# Patient Record
Sex: MALE | Race: WHITE
[De-identification: names, ages, dates, MRNs, and addresses within clinical notes are randomized per-mention and may not be internally consistent; named-entity substitution may affect disease eponyms.]

---

## 2019-09-03 ENCOUNTER — HOSPITAL ENCOUNTER (INPATIENT)
Dept: HOSPITAL 35 - ER | Age: 44
LOS: 1 days | Discharge: HOME | DRG: 305 | End: 2019-09-04
Attending: INTERNAL MEDICINE | Admitting: INTERNAL MEDICINE
Payer: COMMERCIAL

## 2019-09-03 VITALS — DIASTOLIC BLOOD PRESSURE: 95 MMHG | SYSTOLIC BLOOD PRESSURE: 162 MMHG

## 2019-09-03 VITALS — SYSTOLIC BLOOD PRESSURE: 209 MMHG | DIASTOLIC BLOOD PRESSURE: 140 MMHG

## 2019-09-03 VITALS — SYSTOLIC BLOOD PRESSURE: 160 MMHG | DIASTOLIC BLOOD PRESSURE: 99 MMHG

## 2019-09-03 VITALS
HEIGHT: 67.99 IN | BODY MASS INDEX: 34.56 KG/M2 | DIASTOLIC BLOOD PRESSURE: 148 MMHG | WEIGHT: 228 LBS | SYSTOLIC BLOOD PRESSURE: 226 MMHG

## 2019-09-03 VITALS — SYSTOLIC BLOOD PRESSURE: 140 MMHG | DIASTOLIC BLOOD PRESSURE: 88 MMHG

## 2019-09-03 VITALS — DIASTOLIC BLOOD PRESSURE: 99 MMHG | SYSTOLIC BLOOD PRESSURE: 155 MMHG

## 2019-09-03 VITALS — SYSTOLIC BLOOD PRESSURE: 199 MMHG | DIASTOLIC BLOOD PRESSURE: 135 MMHG

## 2019-09-03 VITALS — DIASTOLIC BLOOD PRESSURE: 112 MMHG | SYSTOLIC BLOOD PRESSURE: 168 MMHG

## 2019-09-03 VITALS — DIASTOLIC BLOOD PRESSURE: 104 MMHG | SYSTOLIC BLOOD PRESSURE: 166 MMHG

## 2019-09-03 VITALS — DIASTOLIC BLOOD PRESSURE: 95 MMHG | SYSTOLIC BLOOD PRESSURE: 147 MMHG

## 2019-09-03 VITALS — SYSTOLIC BLOOD PRESSURE: 181 MMHG | DIASTOLIC BLOOD PRESSURE: 102 MMHG

## 2019-09-03 VITALS — SYSTOLIC BLOOD PRESSURE: 148 MMHG | DIASTOLIC BLOOD PRESSURE: 91 MMHG

## 2019-09-03 DIAGNOSIS — R31.9: ICD-10-CM

## 2019-09-03 DIAGNOSIS — E78.5: ICD-10-CM

## 2019-09-03 DIAGNOSIS — I11.9: Primary | ICD-10-CM

## 2019-09-03 DIAGNOSIS — Z79.899: ICD-10-CM

## 2019-09-03 DIAGNOSIS — E11.9: ICD-10-CM

## 2019-09-03 LAB
ALBUMIN SERPL-MCNC: 4 G/DL (ref 3.4–5)
ALT SERPL-CCNC: 81 U/L (ref 30–65)
ANION GAP SERPL CALC-SCNC: 11 MMOL/L (ref 7–16)
AST SERPL-CCNC: 42 U/L (ref 15–37)
BASOPHILS NFR BLD AUTO: 1.1 % (ref 0–2)
BE(VIVO): -1.7 MMOL/L
BILIRUB SERPL-MCNC: 0.9 MG/DL
BILIRUB UR-MCNC: NEGATIVE MG/DL
BUN SERPL-MCNC: 14 MG/DL (ref 7–18)
CALCIUM SERPL-MCNC: 9.4 MG/DL (ref 8.5–10.1)
CHLORIDE SERPL-SCNC: 100 MMOL/L (ref 98–107)
CHOLEST SERPL-MCNC: 203 MG/DL (ref ?–200)
CO2 SERPL-SCNC: 27 MMOL/L (ref 21–32)
COLOR UR: YELLOW
CREAT SERPL-MCNC: 1 MG/DL (ref 0.7–1.3)
EOSINOPHIL NFR BLD: 1.8 % (ref 0–3)
ERYTHROCYTE [DISTWIDTH] IN BLOOD BY AUTOMATED COUNT: 13.8 % (ref 10.5–14.5)
GAS PNL BLDV: 45.9 MMHG (ref 35–45)
GLUCOSE SERPL-MCNC: 265 MG/DL (ref 74–106)
GRANULOCYTES NFR BLD MANUAL: 69.8 % (ref 36–66)
HCO3 BLD-SCNC: 23.7 MMOL/L (ref 22–26)
HCT VFR BLD CALC: 49.5 % (ref 42–52)
HDLC SERPL-MCNC: 36 MG/DL (ref 40–?)
HGB BLD-MCNC: 17.1 GM/DL (ref 14–18)
KETONES UR STRIP-MCNC: (no result) MG/DL
LDLC SERPL-MCNC: 142 MG/DL (ref ?–100)
LIPASE: 105 U/L (ref 73–393)
LYMPHOCYTES NFR BLD AUTO: 23 % (ref 24–44)
MCH RBC QN AUTO: 29.4 PG (ref 26–34)
MCHC RBC AUTO-ENTMCNC: 34.5 G/DL (ref 28–37)
MCV RBC: 85.4 FL (ref 80–100)
MONOCYTES NFR BLD: 4.3 % (ref 1–8)
MUCUS: (no result) STRN/LPF
NEUTROPHILS # BLD: 6.7 THOU/UL (ref 1.4–8.2)
NITRITE UR QL STRIP: NEGATIVE
PCO2 BLDV: 42.5 MMHG (ref 41–51)
PLATELET # BLD: 195 THOU/UL (ref 150–400)
POTASSIUM SERPL-SCNC: 3.1 MMOL/L (ref 3.5–5.1)
PROT SERPL-MCNC: 8.5 G/DL (ref 6.4–8.2)
RBC # BLD AUTO: 5.8 MIL/UL (ref 4.5–6)
RBC # UR STRIP: (no result) /UL
RBC #/AREA URNS HPF: (no result) /HPF (ref 0–2)
SODIUM SERPL-SCNC: 138 MMOL/L (ref 136–145)
SP GR UR STRIP: >= 1.03 (ref 1–1.03)
SQUAMOUS: (no result) /LPF (ref 0–3)
TC:HDL: 5.6 RATIO
TRANSITIONAL EPITHEL CELL: (no result) /LPF
TRIGL SERPL-MCNC: 125 MG/DL (ref ?–150)
TROPONIN I SERPL-MCNC: <0.06 NG/ML (ref ?–0.06)
URINE CLARITY: CLEAR
URINE GLUCOSE-RANDOM*: (no result)
URINE LEUKOCYTES: NEGATIVE
URINE PROTEIN (DIPSTICK): (no result)
UROBILINOGEN UR STRIP-ACNC: 0.2 E.U./DL (ref 0.2–1)
VLDLC SERPL CALC-MCNC: 25 MG/DL (ref ?–40)
WBC # BLD AUTO: 9.6 THOU/UL (ref 4–11)
WBC #/AREA URNS HPF: (no result) /HPF (ref 0–5)

## 2019-09-03 PROCEDURE — 10879: CPT

## 2019-09-03 NOTE — EKG
[address] [employer]
[place]  [address]
Phone:  [phone]                    ELECTROCARDIOGRAM REPORT      
_______________________________________________________________________________
 
Name:       LEEANNA FLORES                  Room #:         170-8       ADM IN  
M.R.#:      [id number]     Account #:      [id number]  
Admission:  19    Attend Phys:    Devin Negron
Discharge:              Date of Birth:  [birth year]  
                                                          Report #: [id number]
   [id number]
_______________________________________________________________________________
THIS REPORT FOR:   //name//                          
 
                         [facility] ED
                                       
Test Date:    2019               Test Time:    07:08:46
Pat Name:     LEEANNA FLORES             Department:   
Patient ID:   [id number]            Room:         170
Gender:       M                        Technician:   LUISITO
:          1975               Requested By: David Hackett
Order Number: [id number] MD:   John Mathias
                                 Measurements
Intervals                              Axis          
Rate:         84                       P:            49
NH:           217                      QRS:          -18
QRSD:         122                      T:            74
QT:           424                                    
QTc:          502                                    
                           Interpretive Statements
Sinus rhythm
Prolonged NH interval
Left atrial enlargement
Right bundle branch block
Left ventricular hypertrophy
 
Electronically Signed On 9-3-2019 9:14:30 CDT by John Mathias
[email or web address]
 
 
 
 
 
 
 
 
 
 
 
 
 
 
 
 
 
  <ELECTRONICALLY SIGNED>
   By: John Mathias MD        
  19     0914
D: 19 0708                           _____________________________________
T: 19                           John Mathias MD          /JOSE LUIS

## 2019-09-03 NOTE — NUR
Admitted from ER due to hypertensive emergency, transferred to room safely.
A+Ox4, drowsy. On room air. With SL at R AC. Able to use urinal to pass urine.
Complained of pain and nausea, due PRN medications given as prescribed. High
falls risk- falls bundle in place. Unable to do complete medication
reconcilaltion, pt unable to recall medications- will try to obtain
information once pt is more awake and feeling well. Assisted in ADLs. On blood
sugar monitoring- taken and recorded accordingly, sliding scale insulin given
as prescribed. Pt's initial BP upon admission 11am
205/141- Nicardipine started
as per EMAR order, checked at 11:/92, 11:/91, 12:/83.
Pt seen by Dr Negron- to stop Nicardipine drip.
Admission care rendered. Pt's BP at 1400, after turning off Nicardipine drip
is 178/106- Called Dr Negron, he said to keep Nicardipine drip off, he's ok
as long as SBP not over 180's- Charge nurse informed. 1500- /102, 1600-
/80, 1700-191/111- Dr Negron informed- To give hydralazine 10mg q4 for
Bp <180mmhg.
Pt on heart monitor- stips attached to chart. To continue monitoring.
Pt admission papers-unable to sign as of the moment, to try again tomorrow.
With valuables at the bedside, called in Security for Safekeeping.

## 2019-09-04 VITALS — DIASTOLIC BLOOD PRESSURE: 68 MMHG | SYSTOLIC BLOOD PRESSURE: 102 MMHG

## 2019-09-04 VITALS — SYSTOLIC BLOOD PRESSURE: 111 MMHG | DIASTOLIC BLOOD PRESSURE: 56 MMHG

## 2019-09-04 VITALS — DIASTOLIC BLOOD PRESSURE: 76 MMHG | SYSTOLIC BLOOD PRESSURE: 129 MMHG

## 2019-09-04 VITALS — SYSTOLIC BLOOD PRESSURE: 155 MMHG | DIASTOLIC BLOOD PRESSURE: 80 MMHG

## 2019-09-04 VITALS — DIASTOLIC BLOOD PRESSURE: 85 MMHG | SYSTOLIC BLOOD PRESSURE: 119 MMHG

## 2019-09-04 VITALS — DIASTOLIC BLOOD PRESSURE: 77 MMHG | SYSTOLIC BLOOD PRESSURE: 145 MMHG

## 2019-09-04 VITALS — SYSTOLIC BLOOD PRESSURE: 131 MMHG | DIASTOLIC BLOOD PRESSURE: 76 MMHG

## 2019-09-04 VITALS — SYSTOLIC BLOOD PRESSURE: 159 MMHG | DIASTOLIC BLOOD PRESSURE: 98 MMHG

## 2019-09-04 VITALS — SYSTOLIC BLOOD PRESSURE: 163 MMHG | DIASTOLIC BLOOD PRESSURE: 99 MMHG

## 2019-09-04 LAB
ANION GAP SERPL CALC-SCNC: 9 MMOL/L (ref 7–16)
BUN SERPL-MCNC: 20 MG/DL (ref 7–18)
CALCIUM SERPL-MCNC: 8.4 MG/DL (ref 8.5–10.1)
CHLORIDE SERPL-SCNC: 101 MMOL/L (ref 98–107)
CO2 SERPL-SCNC: 27 MMOL/L (ref 21–32)
CREAT SERPL-MCNC: 1.1 MG/DL (ref 0.7–1.3)
EST. AVERAGE GLUCOSE BLD GHB EST-MCNC: 163 MG/DL
GLUCOSE SERPL-MCNC: 163 MG/DL (ref 74–106)
GLYCOHEMOGLOBIN (HGB A1C): 7.3 % (ref 4.8–5.6)
MAGNESIUM SERPL-MCNC: 2.1 MG/DL (ref 1.8–2.4)
POTASSIUM SERPL-SCNC: 3.8 MMOL/L (ref 3.5–5.1)
SODIUM SERPL-SCNC: 137 MMOL/L (ref 136–145)

## 2019-09-04 NOTE — NUR
ASSUMED CARE AT 1900. PT'S BP ELEVATED AT START OF SHIFT, 181/102 /85;
GIVEN SCHEDULED CLONIDINE, BP'S DOWN -140/70-80. PT DENIES NAUSEA, BUT
REPORTS HE DOESN'T FEEL HUNGRY EITHER; ENCOURAGED HIM TO EAT MORE BLAND FOODS
INSTEAD OF SOME OF THE HIGHLY ACIDIC FRUITS HE HAD AT BEDSIDE. PT C/O FEELING
DIZZY AND HAVING GENERALIZED PAIN, ESPECIALLY IN HIS HEAD. AROUSEABLE BUT VERY
DROWSY, VERY WEAK LEGS. OBTAINED ORDERS TO REDRAW ELECTROLYTE LABS THIS AM. NO
OTHER CONCERNS, WILL CONTINUE TO MONITOR.

## 2019-09-04 NOTE — NUR
Case opened to follow for dc planning. Pt is orientedx 4 but drowsy. He
indicates that he lives with roommates in a house in [place]. He works
fulltime at [employer] and has insurance. He does not have an insurance card
and can not recall what ins plan he has. Writer assisted pt to call his
employer and they report he has [employer] [place]. Writer assisted the pt
in calling his customer service and verifying his ID/Gp number. Information
then provided to admitting and CM RN. Scripts faxed to pt's pharmacy at [facility] as he has used them before and they have his ins info on file. Cab
voucher provided for possible dc home later today. The pt is dependent on
bus transport most of the time. He requested help setting up a new pt appt
with a pcp. Insurance provided a list of options. Writer arranged a new pt
appt with the NP at Dr. Keith Bullock office [address] [place] [address] in [place]. The
appt is next tuesday at 10am. Instructions documented on the pt's dc
instructions.  Care team updated.

## 2019-09-06 ENCOUNTER — HOSPITAL ENCOUNTER (INPATIENT)
Dept: HOSPITAL 35 - ER | Age: 44
LOS: 3 days | Discharge: HOME | DRG: 78 | End: 2019-09-09
Attending: INTERNAL MEDICINE | Admitting: INTERNAL MEDICINE
Payer: COMMERCIAL

## 2019-09-06 VITALS — SYSTOLIC BLOOD PRESSURE: 206 MMHG | DIASTOLIC BLOOD PRESSURE: 130 MMHG

## 2019-09-06 VITALS — SYSTOLIC BLOOD PRESSURE: 170 MMHG | DIASTOLIC BLOOD PRESSURE: 104 MMHG

## 2019-09-06 VITALS — BODY MASS INDEX: 37.67 KG/M2 | WEIGHT: 240 LBS | HEIGHT: 67.01 IN

## 2019-09-06 VITALS — DIASTOLIC BLOOD PRESSURE: 100 MMHG | SYSTOLIC BLOOD PRESSURE: 168 MMHG

## 2019-09-06 VITALS — DIASTOLIC BLOOD PRESSURE: 135 MMHG | SYSTOLIC BLOOD PRESSURE: 229 MMHG

## 2019-09-06 VITALS — DIASTOLIC BLOOD PRESSURE: 99 MMHG | SYSTOLIC BLOOD PRESSURE: 150 MMHG

## 2019-09-06 VITALS — SYSTOLIC BLOOD PRESSURE: 150 MMHG | DIASTOLIC BLOOD PRESSURE: 99 MMHG

## 2019-09-06 VITALS — SYSTOLIC BLOOD PRESSURE: 192 MMHG | DIASTOLIC BLOOD PRESSURE: 116 MMHG

## 2019-09-06 VITALS — DIASTOLIC BLOOD PRESSURE: 134 MMHG | SYSTOLIC BLOOD PRESSURE: 214 MMHG

## 2019-09-06 VITALS — DIASTOLIC BLOOD PRESSURE: 107 MMHG | SYSTOLIC BLOOD PRESSURE: 164 MMHG

## 2019-09-06 VITALS — DIASTOLIC BLOOD PRESSURE: 124 MMHG | SYSTOLIC BLOOD PRESSURE: 206 MMHG

## 2019-09-06 DIAGNOSIS — Z91.14: ICD-10-CM

## 2019-09-06 DIAGNOSIS — I67.4: Primary | ICD-10-CM

## 2019-09-06 DIAGNOSIS — E11.65: ICD-10-CM

## 2019-09-06 DIAGNOSIS — I10: ICD-10-CM

## 2019-09-06 DIAGNOSIS — E66.01: ICD-10-CM

## 2019-09-06 DIAGNOSIS — E78.00: ICD-10-CM

## 2019-09-06 DIAGNOSIS — Z79.84: ICD-10-CM

## 2019-09-06 DIAGNOSIS — I16.1: ICD-10-CM

## 2019-09-06 LAB
ALBUMIN SERPL-MCNC: 3.7 G/DL (ref 3.4–5)
ALT SERPL-CCNC: 74 U/L (ref 30–65)
ANION GAP SERPL CALC-SCNC: 9 MMOL/L (ref 7–16)
AST SERPL-CCNC: 42 U/L (ref 15–37)
BASOPHILS NFR BLD AUTO: 0.9 % (ref 0–2)
BILIRUB SERPL-MCNC: 0.8 MG/DL
BUN SERPL-MCNC: 16 MG/DL (ref 7–18)
CALCIUM SERPL-MCNC: 9.1 MG/DL (ref 8.5–10.1)
CHLORIDE SERPL-SCNC: 104 MMOL/L (ref 98–107)
CO2 SERPL-SCNC: 28 MMOL/L (ref 21–32)
CREAT SERPL-MCNC: 1 MG/DL (ref 0.7–1.3)
EOSINOPHIL NFR BLD: 1.5 % (ref 0–3)
ERYTHROCYTE [DISTWIDTH] IN BLOOD BY AUTOMATED COUNT: 13.8 % (ref 10.5–14.5)
GLUCOSE SERPL-MCNC: 132 MG/DL (ref 74–106)
GRANULOCYTES NFR BLD MANUAL: 64.8 % (ref 36–66)
HCT VFR BLD CALC: 50.8 % (ref 42–52)
HGB BLD-MCNC: 17.4 GM/DL (ref 14–18)
LYMPHOCYTES NFR BLD AUTO: 24.4 % (ref 24–44)
MCH RBC QN AUTO: 29.7 PG (ref 26–34)
MCHC RBC AUTO-ENTMCNC: 34.2 G/DL (ref 28–37)
MCV RBC: 86.7 FL (ref 80–100)
MONOCYTES NFR BLD: 8.4 % (ref 1–8)
NEUTROPHILS # BLD: 5.9 THOU/UL (ref 1.4–8.2)
PLATELET # BLD: 263 THOU/UL (ref 150–400)
POTASSIUM SERPL-SCNC: 3.5 MMOL/L (ref 3.5–5.1)
PROT SERPL-MCNC: 8.3 G/DL (ref 6.4–8.2)
RBC # BLD AUTO: 5.86 MIL/UL (ref 4.5–6)
SODIUM SERPL-SCNC: 141 MMOL/L (ref 136–145)
TROPONIN I SERPL-MCNC: 0.1 NG/ML (ref ?–0.06)
WBC # BLD AUTO: 9 THOU/UL (ref 4–11)

## 2019-09-06 PROCEDURE — 10879: CPT

## 2019-09-06 NOTE — NUR
CALLED REPORT TO [facility]. NICARDIPINE GTT ORDERED, NOT AVAILABLE IN ED
[facility]. RECBEST RN WILL INITIATE GTT. PT BP REMAINS ELEVATED. OTHERWISE, NAD
NOTED. PT XFERED TO UNIT BY ED TECH.

## 2019-09-06 NOTE — NUR
Received pt from the ER, BP elevated. Orders for Nicardipine drip have not
started and was informed by ER that this was to be started at [facility]. Csalled
pharmacy for the medication was informed that this needs to be done in [facility],
asked the charge nurse, i was advised they had not done this drip for this
unit. Informed house sup, was advised that [facility] is cc [facility] and this was done
before and this needs to be done, I informed house sup I am from [facility] and
needed assistance, Petros from [facility] was sent to start and educated on the drip.
BP monitored, POC followed. endorsed to the night nurse.

## 2019-09-07 VITALS — SYSTOLIC BLOOD PRESSURE: 134 MMHG | DIASTOLIC BLOOD PRESSURE: 81 MMHG

## 2019-09-07 VITALS — SYSTOLIC BLOOD PRESSURE: 158 MMHG | DIASTOLIC BLOOD PRESSURE: 108 MMHG

## 2019-09-07 VITALS — SYSTOLIC BLOOD PRESSURE: 160 MMHG | DIASTOLIC BLOOD PRESSURE: 109 MMHG

## 2019-09-07 VITALS — SYSTOLIC BLOOD PRESSURE: 121 MMHG | DIASTOLIC BLOOD PRESSURE: 75 MMHG

## 2019-09-07 VITALS — DIASTOLIC BLOOD PRESSURE: 98 MMHG | SYSTOLIC BLOOD PRESSURE: 151 MMHG

## 2019-09-07 VITALS — SYSTOLIC BLOOD PRESSURE: 137 MMHG | DIASTOLIC BLOOD PRESSURE: 90 MMHG

## 2019-09-07 VITALS — SYSTOLIC BLOOD PRESSURE: 149 MMHG | DIASTOLIC BLOOD PRESSURE: 82 MMHG

## 2019-09-07 VITALS — SYSTOLIC BLOOD PRESSURE: 135 MMHG | DIASTOLIC BLOOD PRESSURE: 89 MMHG

## 2019-09-07 VITALS — SYSTOLIC BLOOD PRESSURE: 189 MMHG | DIASTOLIC BLOOD PRESSURE: 123 MMHG

## 2019-09-07 VITALS — DIASTOLIC BLOOD PRESSURE: 95 MMHG | SYSTOLIC BLOOD PRESSURE: 150 MMHG

## 2019-09-07 LAB
CHOLEST SERPL-MCNC: 188 MG/DL (ref ?–200)
EST. AVERAGE GLUCOSE BLD GHB EST-MCNC: 160 MG/DL
GLYCOHEMOGLOBIN (HGB A1C): 7.2 % (ref 4.8–5.6)
HDLC SERPL-MCNC: 38 MG/DL (ref 40–?)
LDLC SERPL-MCNC: 133 MG/DL (ref ?–100)
TC:HDL: 4.9 RATIO
TRIGL SERPL-MCNC: 87 MG/DL (ref ?–150)
VLDLC SERPL CALC-MCNC: 17 MG/DL (ref ?–40)

## 2019-09-07 NOTE — NUR
ASSUMED PATIENT CARE AT 0700. A/O X4. OFF [facility] GTT AT 1630. /90. NO
N/V. DENIES PAIN. DIABETES EDUCATION GIVEN. SOLWLY TOWARDS POC GOALS.

## 2019-09-07 NOTE — NUR
PATIENT IS ALERT AND ORIENTED. PATIENT IS SBA. PATIENT IS SINUS TACH ON TELE.
PATIENTS LBM WAS THE 6TH. PATIENT WAS ON THE NICARDIPINE DRIP CURRENTLY AT
3.5MG/HR. PATIENT STATES HE HAS INTERMIENT CHEST PAIN. PATIENT NEEDS SOME DM
AND HTN EDUCATION. PATIENT IS RESTING COMFORTABLY IN BED. WCM. PATIENT IS ON
ROOM AIR.

## 2019-09-08 VITALS — SYSTOLIC BLOOD PRESSURE: 167 MMHG | DIASTOLIC BLOOD PRESSURE: 105 MMHG

## 2019-09-08 VITALS — DIASTOLIC BLOOD PRESSURE: 113 MMHG | SYSTOLIC BLOOD PRESSURE: 159 MMHG

## 2019-09-08 VITALS — DIASTOLIC BLOOD PRESSURE: 101 MMHG | SYSTOLIC BLOOD PRESSURE: 144 MMHG

## 2019-09-08 VITALS — DIASTOLIC BLOOD PRESSURE: 116 MMHG | SYSTOLIC BLOOD PRESSURE: 169 MMHG

## 2019-09-08 VITALS — DIASTOLIC BLOOD PRESSURE: 103 MMHG | SYSTOLIC BLOOD PRESSURE: 153 MMHG

## 2019-09-08 VITALS — SYSTOLIC BLOOD PRESSURE: 166 MMHG | DIASTOLIC BLOOD PRESSURE: 112 MMHG

## 2019-09-08 VITALS — SYSTOLIC BLOOD PRESSURE: 168 MMHG | DIASTOLIC BLOOD PRESSURE: 117 MMHG

## 2019-09-08 VITALS — DIASTOLIC BLOOD PRESSURE: 108 MMHG | SYSTOLIC BLOOD PRESSURE: 166 MMHG

## 2019-09-08 NOTE — NUR
PATIENT IS PROGRESSING SLOWLY IN HIS CARE PLAN. VITAL SIGNS MOSTLY STABLE WITH
PATIENT EXHIBITING HYPERTENSION THAT WAS EFFECTIVELY CONTROLLED THROUGH PRN
MEDICATIONS. FULLY ALERT AND ORIENTED, PATIENT WAS ABLE TO CALL APPROPRIATELY
FOR NEEDS AND PARTICIPATE IN CARE. COMPLAINTS OF MINIMAL PAIN CONTROLLED
THROUGH TYLENOL.
PATIENT WAS UP WITH ASSISTANCE INCIDENT FREE. HE IS CONSIDERED
A HIGH FALL RISK. CONTINUE PLAN OF CARE.

## 2019-09-08 NOTE — NUR
PATIENT HAS PREFERRED TO STAY IN BED THROUGH THE DAY DESPITE NUMEROUS
ENCOURAGEMENT. EXPLAINED TO PATIENT THAT IF HE IS NOT DIZZY IT WILL BE
BENEFICIAL TO GET OUT OF BED EVEN TO GO TO THE BATHROOM AND HAVE URINAL.
PATIENT DOES NOT  SEEM TO BE IN PAIN AT THIS TIME. RESPIRATIONS NON LABORED.
BP HAS REMAINED MILDLY HIGH THROUGH THE DAY. PRN HYDRAZALINE ADMINISTERED.

## 2019-09-09 VITALS — SYSTOLIC BLOOD PRESSURE: 165 MMHG | DIASTOLIC BLOOD PRESSURE: 114 MMHG

## 2019-09-09 VITALS — SYSTOLIC BLOOD PRESSURE: 124 MMHG | DIASTOLIC BLOOD PRESSURE: 124 MMHG

## 2019-09-09 VITALS — DIASTOLIC BLOOD PRESSURE: 101 MMHG | SYSTOLIC BLOOD PRESSURE: 147 MMHG

## 2019-09-09 VITALS — DIASTOLIC BLOOD PRESSURE: 95 MMHG | SYSTOLIC BLOOD PRESSURE: 144 MMHG

## 2019-09-09 LAB
ANION GAP SERPL CALC-SCNC: 9 MMOL/L (ref 7–16)
BUN SERPL-MCNC: 17 MG/DL (ref 7–18)
CALCIUM SERPL-MCNC: 9.2 MG/DL (ref 8.5–10.1)
CHLORIDE SERPL-SCNC: 103 MMOL/L (ref 98–107)
CO2 SERPL-SCNC: 26 MMOL/L (ref 21–32)
CREAT SERPL-MCNC: 0.8 MG/DL (ref 0.7–1.3)
GLUCOSE SERPL-MCNC: 158 MG/DL (ref 74–106)
MAGNESIUM SERPL-MCNC: 1.9 MG/DL (ref 1.8–2.4)
POTASSIUM SERPL-SCNC: 4 MMOL/L (ref 3.5–5.1)
SODIUM SERPL-SCNC: 138 MMOL/L (ref 136–145)

## 2019-09-09 NOTE — NUR
PATIENT IS ADVANCING SLOWLY IN HIS CARE PLAN. VITAL SIGNS MOSTLY STABLE WITH
PATIENT RECEIVING FREQUENT MEDICATION AND INTERVENTION FOR HYPERTENSION.
PATIENT FREQUENTLY COMPLAINED OF HEADACHE PAIN WHICH NURSE TREATED THROUGH
MEDICATIONS AND NON PHARMACOLOGICAL INTERVENTIONS. NPO FROM EARLY THIS MORNING
IN ANTICIPATION OF TODAYS PROCEDURE. PATIENT WAS ABLE TO AMBULATE TO RESTROOM
WITH ASSISTANCE INCIDENT FREE. CONTINUE PLAN OF CARE.

## 2019-09-09 NOTE — NUR
PATIENT DISCHARGED AT THIS TIME TO HOME. EDUCATED ON MEDS, EXERCISE, DIET AND
NEED TO CONSISTENTLY TAKE HIS MEDICATIONS. HE DID VOICE UNDERSTANDING. PATIENT
DOES NOT SEEM TO BE IN PAIN. DIZZINESS HAS IMPROVED. HE WALKED AROUND UNIT A
FEW TIMES THIS AM. HE IS ALERT ORIENTED X4. WILL CONT WIHT PLAN OF CARE.

## 2021-03-03 ENCOUNTER — HOSPITAL ENCOUNTER (EMERGENCY)
Dept: HOSPITAL 35 - ER | Age: 46
Discharge: HOME | End: 2021-03-03
Payer: COMMERCIAL

## 2021-03-03 VITALS — DIASTOLIC BLOOD PRESSURE: 105 MMHG | SYSTOLIC BLOOD PRESSURE: 173 MMHG

## 2021-03-03 VITALS — WEIGHT: 220 LBS | HEIGHT: 67 IN | BODY MASS INDEX: 34.53 KG/M2

## 2021-03-03 DIAGNOSIS — Z79.899: ICD-10-CM

## 2021-03-03 DIAGNOSIS — R42: Primary | ICD-10-CM

## 2021-03-03 DIAGNOSIS — E11.9: ICD-10-CM

## 2021-03-03 DIAGNOSIS — I10: ICD-10-CM

## 2021-03-03 LAB
ALBUMIN SERPL-MCNC: 4.1 G/DL (ref 3.4–5)
ALT SERPL-CCNC: 87 U/L (ref 16–63)
ANION GAP SERPL CALC-SCNC: 7 MMOL/L (ref 7–16)
AST SERPL-CCNC: 43 U/L (ref 15–37)
BASOPHILS NFR BLD AUTO: 0.7 % (ref 0–2)
BILIRUB SERPL-MCNC: 0.7 MG/DL (ref 0.2–1)
BUN SERPL-MCNC: 21 MG/DL (ref 7–18)
CALCIUM SERPL-MCNC: 10.1 MG/DL (ref 8.5–10.1)
CHLORIDE SERPL-SCNC: 98 MMOL/L (ref 98–107)
CO2 SERPL-SCNC: 29 MMOL/L (ref 21–32)
CREAT SERPL-MCNC: 1 MG/DL (ref 0.7–1.3)
EOSINOPHIL NFR BLD: 2.6 % (ref 0–3)
ERYTHROCYTE [DISTWIDTH] IN BLOOD BY AUTOMATED COUNT: 13.8 % (ref 10.5–14.5)
GLUCOSE SERPL-MCNC: 175 MG/DL (ref 74–106)
GRANULOCYTES NFR BLD MANUAL: 64.5 % (ref 36–66)
HCT VFR BLD CALC: 50.8 % (ref 42–52)
HGB BLD-MCNC: 17.3 GM/DL (ref 14–18)
LYMPHOCYTES NFR BLD AUTO: 24 % (ref 24–44)
MCH RBC QN AUTO: 29.9 PG (ref 26–34)
MCHC RBC AUTO-ENTMCNC: 34 G/DL (ref 28–37)
MCV RBC: 87.8 FL (ref 80–100)
MONOCYTES NFR BLD: 8.2 % (ref 1–8)
NEUTROPHILS # BLD: 4.8 THOU/UL (ref 1.4–8.2)
PLATELET # BLD: 272 THOU/UL (ref 150–400)
POTASSIUM SERPL-SCNC: 3.7 MMOL/L (ref 3.5–5.1)
PROT SERPL-MCNC: 8.6 G/DL (ref 6.4–8.2)
RBC # BLD AUTO: 5.78 MIL/UL (ref 4.5–6)
SODIUM SERPL-SCNC: 134 MMOL/L (ref 136–145)
TROPONIN I SERPL-MCNC: <0.06 NG/ML (ref ?–0.06)
WBC # BLD AUTO: 7.5 THOU/UL (ref 4–11)

## 2021-03-03 NOTE — EKG
[address]
[place]  [address]
Phone:  [phone]                    ELECTROCARDIOGRAM REPORT      
_______________________________________________________________________________
 
Name:       LEEANNA FLORES                  Room #:                     DEP   
RBYCE#:      [id number]     Account #:      [id number]  
Admission:  21    Attend Phys:                          
Discharge:  21    Date of Birth:  [birth year]  
                                                          Report #: [id number]
   [id number]
_______________________________________________________________________________
                         [facility] ED
                                       
Test Date:    2021               Test Time:    10:32:03
Pat Name:     LEEANNA FLORES             Department:   
Patient ID:   [id number]            Room:          
Gender:       M                        Technician:   anca
:          1975               Requested By: Kirk Granda
Order Number: [id number] MD:   Lucho Torres
                                 Measurements
Intervals                              Axis          
Rate:         67                       P:            10
WI:           177                      QRS:          -34
QRSD:         111                      T:            59
QT:           419                                    
QTc:          443                                    
                           Interpretive Statements
Sinus rhythm
Left axis deviation
Abnormal R-wave progression, late transition
Compared to ECG 2019 15:00:58
Left-axis deviation now present
ST (T wave) deviation no longer present
Electronically Signed On 3-3-2021 13:41:57 CST by Lucho Torres
[email or web address]
 
 
 
 
 
 
 
 
 
 
 
 
 
 
 
 
 
 
 
  <ELECTRONICALLY SIGNED>
   By: Lucho Torres MD, [facility]    
  21     1341
D: 03/03/21 1032                           _____________________________________
T: 21 103                           Lucho Torres MD, FACC      /EPI

## 2021-10-11 ENCOUNTER — HOSPITAL ENCOUNTER (EMERGENCY)
Dept: HOSPITAL 35 - ER | Age: 46
Discharge: HOME | End: 2021-10-11
Payer: COMMERCIAL

## 2021-10-11 VITALS — WEIGHT: 210.01 LBS | HEIGHT: 66 IN | BODY MASS INDEX: 33.75 KG/M2

## 2021-10-11 VITALS — SYSTOLIC BLOOD PRESSURE: 225 MMHG | DIASTOLIC BLOOD PRESSURE: 145 MMHG

## 2021-10-11 DIAGNOSIS — Z79.891: ICD-10-CM

## 2021-10-11 DIAGNOSIS — E11.9: ICD-10-CM

## 2021-10-11 DIAGNOSIS — Z79.84: ICD-10-CM

## 2021-10-11 DIAGNOSIS — Z79.1: ICD-10-CM

## 2021-10-11 DIAGNOSIS — Z79.899: ICD-10-CM

## 2021-10-11 DIAGNOSIS — B37.2: Primary | ICD-10-CM

## 2021-10-11 DIAGNOSIS — I10: ICD-10-CM
